# Patient Record
Sex: FEMALE | Race: WHITE | NOT HISPANIC OR LATINO | URBAN - METROPOLITAN AREA
[De-identification: names, ages, dates, MRNs, and addresses within clinical notes are randomized per-mention and may not be internally consistent; named-entity substitution may affect disease eponyms.]

---

## 2018-06-21 ENCOUNTER — APPOINTMENT (OUTPATIENT)
Dept: URBAN - METROPOLITAN AREA SURGERY 6 | Age: 23
Setting detail: DERMATOLOGY
End: 2018-06-21

## 2018-06-21 DIAGNOSIS — L73.8 OTHER SPECIFIED FOLLICULAR DISORDERS: ICD-10-CM

## 2018-06-21 DIAGNOSIS — L70.0 ACNE VULGARIS: ICD-10-CM

## 2018-06-21 PROCEDURE — 99213 OFFICE O/P EST LOW 20 MIN: CPT

## 2018-06-21 PROCEDURE — OTHER TREATMENT REGIMEN: OTHER

## 2018-06-21 PROCEDURE — OTHER PRESCRIPTION: OTHER

## 2018-06-21 PROCEDURE — OTHER COUNSELING: OTHER

## 2018-06-21 RX ORDER — CLINDAMYCIN PHOSPHATE 10 MG/ML
LOTION TOPICAL
Qty: 1 | Refills: 11 | Status: ERX

## 2018-06-21 ASSESSMENT — LOCATION SIMPLE DESCRIPTION DERM
LOCATION SIMPLE: GROIN
LOCATION SIMPLE: LEFT CHEEK
LOCATION SIMPLE: RIGHT CHEEK

## 2018-06-21 ASSESSMENT — LOCATION DETAILED DESCRIPTION DERM
LOCATION DETAILED: LEFT INGUINAL CREASE
LOCATION DETAILED: RIGHT INFERIOR CENTRAL MALAR CHEEK
LOCATION DETAILED: LEFT INFERIOR CENTRAL MALAR CHEEK

## 2018-06-21 ASSESSMENT — LOCATION ZONE DERM
LOCATION ZONE: TRUNK
LOCATION ZONE: FACE

## 2018-06-21 NOTE — PROCEDURE: TREATMENT REGIMEN
Continue Regimen: Clindamycin lotion once to twice daily
Detail Level: Zone
Otc Regimen: Gentle cleanser and moisturizers

## 2019-08-20 ENCOUNTER — APPOINTMENT (OUTPATIENT)
Dept: URBAN - METROPOLITAN AREA SURGERY 9 | Age: 24
Setting detail: DERMATOLOGY
End: 2019-08-20

## 2019-08-20 DIAGNOSIS — D22 MELANOCYTIC NEVI: ICD-10-CM

## 2019-08-20 DIAGNOSIS — B07.8 OTHER VIRAL WARTS: ICD-10-CM

## 2019-08-20 DIAGNOSIS — L70.0 ACNE VULGARIS: ICD-10-CM

## 2019-08-20 PROBLEM — D22.5 MELANOCYTIC NEVI OF TRUNK: Status: ACTIVE | Noted: 2019-08-20

## 2019-08-20 PROCEDURE — OTHER PRESCRIPTION: OTHER

## 2019-08-20 PROCEDURE — 99213 OFFICE O/P EST LOW 20 MIN: CPT | Mod: 25

## 2019-08-20 PROCEDURE — OTHER TREATMENT REGIMEN: OTHER

## 2019-08-20 PROCEDURE — OTHER COUNSELING: OTHER

## 2019-08-20 PROCEDURE — 17110 DESTRUCT B9 LESION 1-14: CPT

## 2019-08-20 PROCEDURE — OTHER LIQUID NITROGEN: OTHER

## 2019-08-20 PROCEDURE — OTHER REASSURANCE: OTHER

## 2019-08-20 RX ORDER — CLINDAMYCIN PHOSPHATE 10 MG/ML
LOTION TOPICAL
Qty: 1 | Refills: 11 | Status: ERX | COMMUNITY
Start: 2019-08-20

## 2019-08-20 ASSESSMENT — LOCATION DETAILED DESCRIPTION DERM
LOCATION DETAILED: RIGHT INFERIOR CENTRAL MALAR CHEEK
LOCATION DETAILED: EPIGASTRIC SKIN
LOCATION DETAILED: LEFT INFERIOR CENTRAL MALAR CHEEK
LOCATION DETAILED: LEFT LOWER CUTANEOUS LIP
LOCATION DETAILED: RIGHT LOWER CUTANEOUS LIP

## 2019-08-20 ASSESSMENT — LOCATION SIMPLE DESCRIPTION DERM
LOCATION SIMPLE: LEFT CHEEK
LOCATION SIMPLE: RIGHT CHEEK
LOCATION SIMPLE: ABDOMEN
LOCATION SIMPLE: RIGHT LIP
LOCATION SIMPLE: LEFT LIP

## 2019-08-20 ASSESSMENT — LOCATION ZONE DERM
LOCATION ZONE: LIP
LOCATION ZONE: TRUNK
LOCATION ZONE: FACE

## 2019-08-20 NOTE — PROCEDURE: TREATMENT REGIMEN
Otc Regimen: Gentle cleanser and moisturizers
Continue Regimen: Clindamycin lotion once to twice daily
Detail Level: Zone

## 2019-08-20 NOTE — PROCEDURE: LIQUID NITROGEN
Post-care instructions reviewed in detail. May apply Vaseline to crusted or scabbed areas.
Detail Level: Simple
Consent: Informed consent obtained including but not limited to risks of pain, blistering, possibly permanent pigmentary change, recurrence and incomplete removal.
Number Of Freeze-Thaw Cycles: 1 freeze-thaw cycle
Add 52 Modifier (Optional): no
Medical Necessity Clause: Medical necessity:
Total Number Of Lesions Treated: 10
Render Post Care In The Note?: yes
Medical Necessity Information: It is in your best interest to select a reason for this procedure from the list below. All of these items fulfill various CMS LCD requirements except the new and changing color options.

## 2019-09-17 ENCOUNTER — APPOINTMENT (OUTPATIENT)
Dept: URBAN - METROPOLITAN AREA SURGERY 9 | Age: 24
Setting detail: DERMATOLOGY
End: 2019-09-17

## 2019-09-17 DIAGNOSIS — B07.8 OTHER VIRAL WARTS: ICD-10-CM

## 2019-09-17 PROCEDURE — 17110 DESTRUCT B9 LESION 1-14: CPT

## 2019-09-17 PROCEDURE — OTHER LIQUID NITROGEN: OTHER

## 2019-09-17 PROCEDURE — OTHER COUNSELING: OTHER

## 2019-09-17 ASSESSMENT — LOCATION DETAILED DESCRIPTION DERM
LOCATION DETAILED: RIGHT LOWER CUTANEOUS LIP
LOCATION DETAILED: LEFT LOWER CUTANEOUS LIP

## 2019-09-17 ASSESSMENT — LOCATION SIMPLE DESCRIPTION DERM
LOCATION SIMPLE: RIGHT LIP
LOCATION SIMPLE: LEFT LIP

## 2019-09-17 ASSESSMENT — LOCATION ZONE DERM: LOCATION ZONE: LIP

## 2019-09-17 NOTE — PROCEDURE: LIQUID NITROGEN
Post-care instructions reviewed in detail. May apply Vaseline to crusted or scabbed areas.
Add 52 Modifier (Optional): no
Medical Necessity Information: It is in your best interest to select a reason for this procedure from the list below. All of these items fulfill various CMS LCD requirements except the new and changing color options.
Consent: Informed consent obtained including but not limited to risks of pain, blistering, possibly permanent pigmentary change, recurrence and incomplete removal.
Number Of Freeze-Thaw Cycles: 1 freeze-thaw cycle
Total Number Of Lesions Treated: 10
Detail Level: Simple
Medical Necessity Clause: Medical necessity:
Render Post Care In The Note?: yes

## 2019-10-22 ENCOUNTER — APPOINTMENT (OUTPATIENT)
Dept: URBAN - METROPOLITAN AREA SURGERY 9 | Age: 24
Setting detail: DERMATOLOGY
End: 2019-10-23

## 2019-10-22 DIAGNOSIS — B07.8 OTHER VIRAL WARTS: ICD-10-CM

## 2019-10-22 PROCEDURE — OTHER LIQUID NITROGEN: OTHER

## 2019-10-22 PROCEDURE — 17111 DESTRUCT LESION 15 OR MORE: CPT

## 2019-10-22 PROCEDURE — OTHER COUNSELING: OTHER

## 2019-10-22 ASSESSMENT — LOCATION DETAILED DESCRIPTION DERM
LOCATION DETAILED: RIGHT LOWER CUTANEOUS LIP
LOCATION DETAILED: LEFT LOWER CUTANEOUS LIP

## 2019-10-22 ASSESSMENT — LOCATION ZONE DERM: LOCATION ZONE: LIP

## 2019-10-22 ASSESSMENT — LOCATION SIMPLE DESCRIPTION DERM
LOCATION SIMPLE: RIGHT LIP
LOCATION SIMPLE: LEFT LIP

## 2019-10-22 NOTE — PROCEDURE: LIQUID NITROGEN
Detail Level: Simple
Render In Bullet Format When Appropriate: No
Duration Of Freeze Thaw-Cycle (Seconds): 10
Post-care instructions reviewed in detail. May apply Vaseline to crusted or scabbed areas.
Medical Necessity Information: It is in your best interest to select a reason for this procedure from the list below. All of these items fulfill various CMS LCD requirements except the new and changing color options.
Total Number Of Lesions Treated: 15
Render Post Care In The Note?: yes
Medical Necessity Clause: Medical necessity:
Consent: Informed consent obtained including but not limited to risks of pain, blistering, possibly permanent pigmentary change, recurrence and incomplete removal.
Number Of Freeze-Thaw Cycles: 1 freeze-thaw cycle

## 2019-11-19 ENCOUNTER — APPOINTMENT (OUTPATIENT)
Dept: URBAN - METROPOLITAN AREA SURGERY 9 | Age: 24
Setting detail: DERMATOLOGY
End: 2019-11-19

## 2019-11-19 DIAGNOSIS — B07.8 OTHER VIRAL WARTS: ICD-10-CM

## 2019-11-19 PROCEDURE — OTHER COUNSELING: OTHER

## 2019-11-19 PROCEDURE — 17110 DESTRUCT B9 LESION 1-14: CPT

## 2019-11-19 PROCEDURE — OTHER LIQUID NITROGEN: OTHER

## 2019-11-19 PROCEDURE — OTHER PRESCRIPTION: OTHER

## 2019-11-19 RX ORDER — IMIQUIMOD 50 MG/G
CREAM TOPICAL TIW
Qty: 2 | Refills: 3 | Status: ERX | COMMUNITY
Start: 2019-11-19

## 2019-11-19 ASSESSMENT — LOCATION DETAILED DESCRIPTION DERM: LOCATION DETAILED: LEFT LOWER CUTANEOUS LIP

## 2019-11-19 ASSESSMENT — LOCATION ZONE DERM: LOCATION ZONE: LIP

## 2019-11-19 ASSESSMENT — LOCATION SIMPLE DESCRIPTION DERM: LOCATION SIMPLE: LEFT LIP

## 2019-11-19 NOTE — PROCEDURE: LIQUID NITROGEN
Detail Level: Simple
Render In Bullet Format When Appropriate: No
Consent: Informed consent obtained including but not limited to risks of pain, blistering, possibly permanent pigmentary change, recurrence and incomplete removal.
Render Post Care In The Note?: yes
Duration Of Freeze Thaw-Cycle (Seconds): 10
Medical Necessity Clause: Medical necessity:
Total Number Of Lesions Treated: 3
Medical Necessity Information: It is in your best interest to select a reason for this procedure from the list below. All of these items fulfill various CMS LCD requirements except the new and changing color options.
Number Of Freeze-Thaw Cycles: 1 freeze-thaw cycle
Post-care instructions reviewed in detail. May apply Vaseline to crusted or scabbed areas.